# Patient Record
Sex: FEMALE | Race: WHITE | Employment: UNEMPLOYED | URBAN - METROPOLITAN AREA
[De-identification: names, ages, dates, MRNs, and addresses within clinical notes are randomized per-mention and may not be internally consistent; named-entity substitution may affect disease eponyms.]

---

## 2021-11-13 ENCOUNTER — OFFICE VISIT (OUTPATIENT)
Dept: URGENT CARE | Facility: URGENT CARE | Age: 41
End: 2021-11-13

## 2021-11-13 VITALS
WEIGHT: 159.8 LBS | SYSTOLIC BLOOD PRESSURE: 104 MMHG | DIASTOLIC BLOOD PRESSURE: 64 MMHG | HEART RATE: 57 BPM | OXYGEN SATURATION: 100 %

## 2021-11-13 DIAGNOSIS — M54.50 ACUTE MIDLINE LOW BACK PAIN WITHOUT SCIATICA: Primary | ICD-10-CM

## 2021-11-13 PROCEDURE — 99203 OFFICE O/P NEW LOW 30 MIN: CPT | Performed by: STUDENT IN AN ORGANIZED HEALTH CARE EDUCATION/TRAINING PROGRAM

## 2021-11-13 RX ORDER — IBUPROFEN 600 MG/1
600 TABLET, FILM COATED ORAL EVERY 6 HOURS PRN
Qty: 28 TABLET | Refills: 0 | Status: SHIPPED | OUTPATIENT
Start: 2021-11-13 | End: 2021-11-20

## 2021-11-13 NOTE — PROGRESS NOTES
Assessment & Plan     Acute midline low back pain without sciatica  Suspect muscle strain. No concerning neuro symptoms or signs. Recommended conservative management with NSAIDs, Tylenol. Provided work letter with restriction no lifting >10lbs. Discussed symptomatic cares and reasons to return to  or present to ED. Patient in agreement with plan. If persistent pain, consider X rats, PT referral, muscle relaxant, etc.  - ibuprofen (ADVIL/MOTRIN) 600 MG tablet  Dispense: 28 tablet; Refill: 0           Return in about 2 weeks (around 11/27/2021), or if symptoms worsen or fail to improve.    Lore Bianchi MD  [facility] АЛЕКСАНДР Stoner is a 41 year old female who presents to clinic today for the following health issues:  Chief Complaint   Patient presents with     Work Comp     11/11/2021 back and stomach     HPI    MS Injury/Pain  Onset of symptoms was 2 day(s) ago.  Pt was injured at work carrying heavy things hurt back and stomach hurts  Employer is [employer]  Back hurts and stomach hurts  Context:       The injury happened while at work      Mechanism: twisting      Patient experienced delayed pain  Could not work today and yesterday  Course of symptoms is improving.    Severity mild  Current and Associated symptoms: Pain  Denies  Swelling, Bruising, Warmth, Redness and Decreased range of motion  Aggravating Factors: none  Therapies to improve symptoms include: ibuprofen and rest  This is the first time this type of problem has occurred for this patient.   No family history of back issues or rheumatologic problems        Objective    /64 (BP Location: Right arm, Patient Position: Sitting, Cuff Size: Adult Regular)   Pulse 57   Wt 72.5 kg (159 lb 12.8 oz)   SpO2 100%   Physical Exam   GENERAL APPEARANCE: healthy, alert and no distress  NECK: no adenopathy and no asymmetry, masses, or scars  RESP: breathing comfortably on room air  ABD: Soft  nontender nondistended  SKIN: no suspicious lesions or rashes; no bruising  NEURO: Normal strength and tone, sensory exam grossly normal, mentation intact, speech normal, DTR symmetrically normal in lower extremities and normal strength throughout (deltoids, biceps, triceps, knee extension and flexion, plantar and dorsiflexion)  Comprehensive back pain exam:  No tenderness and Range of motion not limited by pain

## 2021-11-13 NOTE — PATIENT INSTRUCTIONS
Patient Education     Dolor De Ayush O De Espalda [Neck/Back Pain, General]    Tanto el dolor de ayush maite el de espalda suelen ser consecuencia de estee lesión en los músculos o ligamentos de la columna vertebral. Algunas veces los discos que separan cada hueso de la columna pueden producir dolor al ejercer presión sobre el nervio que está al lado. El dolor de ayush y de espalda puede aparecer a consecuencia de estee fuerza que produce torsión repentina (maite en un accidente de automóvil) o de un movimiento inhabitual. En ambos casos, es frecuente que el problema vaya acompañado por espasmos musculares que contribuyen al dolor.  El dolor jose de ayush y de espalda suele mejorar en estee o dos semanas. El dolor relacionado con los trastornos de los discos, la artritis en las articulaciones vertebrales o la estenosis (estrechamiento) del canal medular pueden convertirse en problemas crónicos y durar meses o años.  Cuidados En [place]:  1. PARA EL DOLOR DE AYUSH: Use estee almohada cómoda que dé soporte a la emilie y mantenga la columna en estee posición neutral. La emilie no debe estar inclinada hacia adelante ni hacia atrás.  PARA EL DOLOR DE ESPALDA: Es posible que deba permanecer en la cama los primeros días. Ela tan pronto pueda, debe empezar a sentarse o caminar para evitar los problemas asociados con el descanso prolongado en la cama (debilidad muscular, empeoramiento de la rigidez y el dolor en la espalda, y coágulos sanguíneos en las piernas).  2. Cuando esté en la cama, trate de encontrar estee posición cómoda. Es aconsejable que use un colchón firme. Intente acostarse boca arriba con almohadas debajo de las rodillas. También puede intentar acostarse de lado con las rodillas dobladas hacia el pecho y estee almohada entre las rodillas.  3. Evite permanecer sentado erika períodos largos, ya que esto implica más esfuerzo en la parte inferior de la espalda que cuando está de pié o caminando.  4. Algunas personas  encuentran alivio con la aplicación de calor (ducha o baño caliente, o estee compresa caliente) y masajes, mientras que otras prefieren aplicar frío (hielo crissy o en cubos dentro de estee bolsa envuelta en estee toalla). Pruebe ambos métodos y use el que le dé mejor resultado erika 20 minutos varias veces al día.  5. Puede usar acetaminofén (Tylenol) o ibuprofeno (Motrin o Advil) para controlar el dolor, a menos que le hayan recetado otro medicamento. [NOTA: Si tiene estee enfermedad hepática o renal crónica, o ha tenido alguna vez estee úlcera estomacal o sangrado gastrointestinal, consulte con avila médico antes de anyi estos medicamentos.]  6. Aprenda las técnicas adecuadas para levantar objetos y no levante nada que pese más de 15 libras hasta que el dolor haya desaparecido por completo.  Programe estee VISITA DE CONTROL con avila médico o con maine centro si los síntomas no empiezan a mejorar después de estee semana. Es posible que necesite fisioterapia o pruebas adicionales.  [NOTA: Si le hicieron radiografías, éstas serán examinadas por un radiólogo y le informarán de los nuevos hallazgos que puedan afectar la atención médica que necesita.]  Busque Prontamente Atención Médica  si algo de lo siguiente ocurre:    El dolor empeora o se extiende a los brazos o a las piernas    Debilidad, entumecimiento o dolor en alexandre o ambos brazos o piernas    Pérdida del control del intestino o de la vejiga    Entumecimiento en la yesenia de las ingles    Dificultad para caminar    Fiebre de 100.4 F (38 C) o más mukul, o maite le haya indicado avila proveedor de atención médica

## 2024-09-08 NOTE — LETTER
November 13, 2021      Georgiana Espinosa  [address]   [facility] [facility] [address]        To Whom It May Concern:    Georgiana Espinosa was seen on 11/13/21.  Please excuse her until 11/16/21 due to injury. Please excuse her absences from work on 11/12 and 11/13 due to injury.  She may return to work with restrictions of lifting no more than 10 pounds until evaluated by another provider.        Sincerely,        Lore Bianchi MD     Abormal VS: Temp > 100F or < 96.8F; SBP < 90 mmHG; HR > 120bpm; Resp > 24/min